# Patient Record
Sex: FEMALE | Race: WHITE | NOT HISPANIC OR LATINO | Employment: FULL TIME | URBAN - METROPOLITAN AREA
[De-identification: names, ages, dates, MRNs, and addresses within clinical notes are randomized per-mention and may not be internally consistent; named-entity substitution may affect disease eponyms.]

---

## 2017-09-19 ENCOUNTER — OFFICE VISIT (OUTPATIENT)
Dept: NEUROSURGERY | Facility: CLINIC | Age: 32
End: 2017-09-19

## 2017-09-19 VITALS — WEIGHT: 257 LBS | HEIGHT: 66 IN | TEMPERATURE: 96.4 F | BODY MASS INDEX: 41.3 KG/M2

## 2017-09-19 DIAGNOSIS — G93.89 CEREBRAL VENTRICULOMEGALY: Primary | ICD-10-CM

## 2017-09-19 PROCEDURE — 99202 OFFICE O/P NEW SF 15 MIN: CPT | Performed by: NEUROLOGICAL SURGERY

## 2017-09-19 RX ORDER — LEVOCETIRIZINE DIHYDROCHLORIDE 5 MG/1
5 TABLET, FILM COATED ORAL EVERY EVENING
COMMUNITY

## 2017-09-19 RX ORDER — IBUPROFEN 400 MG/1
400 TABLET ORAL EVERY 6 HOURS PRN
COMMUNITY

## 2017-09-19 RX ORDER — FEXOFENADINE HCL 180 MG/1
180 TABLET ORAL DAILY
COMMUNITY

## 2017-09-19 RX ORDER — LOSARTAN POTASSIUM AND HYDROCHLOROTHIAZIDE 25; 100 MG/1; MG/1
1 TABLET ORAL DAILY
COMMUNITY

## 2017-09-19 RX ORDER — SULFAMETHOXAZOLE AND TRIMETHOPRIM 800; 160 MG/1; MG/1
1 TABLET ORAL 2 TIMES DAILY
COMMUNITY

## 2017-09-19 RX ORDER — MONTELUKAST SODIUM 10 MG/1
10 TABLET ORAL NIGHTLY
COMMUNITY

## 2017-09-19 RX ORDER — BISOPROLOL FUMARATE 5 MG/1
2.5 TABLET ORAL DAILY
COMMUNITY

## 2017-09-19 RX ORDER — RANITIDINE 150 MG/1
150 TABLET ORAL 2 TIMES DAILY
COMMUNITY

## 2017-09-19 NOTE — PROGRESS NOTES
Tammi DENIS Hardin  1985  [id number]      Chief Complaint   Patient presents with   • Abnormal Brain Mri       HISTORY OF PRESENT ILLNESS:  This is a 32-year-old female who had issues with sinusitis and abscess at which time she had a CT scan which showed ventricular enlargement.  She subscap MRI which was interpreted as showing that she had moderate ventricular enlargement which was out of proportion to cortical atrophy.  She was referred for neurosurgical consultation.    She is completely asymptomatic in reference to increased intracranial pressure cognitive function and cerebellar function.  She has no symptoms at all.  This is truly a incidental finding.     Past Medical History:   Diagnosis Date   • Asthma    • Diabetes mellitus    • Hypertension        Past Surgical History:   Procedure Laterality Date   • GALLBLADDER SURGERY     • TONSILECTOMY, ADENOIDECTOMY, BILATERAL MYRINGOTOMY AND TUBES         Family History   Problem Relation Age of Onset   • Hyperlipidemia Mother    • Hypertension Mother    • Diabetes Father        Social History     Social History   • Marital status: Single     Spouse name: N/A   • Number of children: N/A   • Years of education: N/A     Occupational History   • Not on file.     Social History Main Topics   • Smoking status: Never Smoker   • Smokeless tobacco: Not on file   • Alcohol use No   • Drug use: No   • Sexual activity: Defer     Other Topics Concern   • Not on file     Social History Narrative   • No narrative on file       Allergies   Allergen Reactions   • Keflex [Cephalexin]    • Rondomycin [Methacycline]    • Wymox [Amoxicillin]          Current Outpatient Prescriptions:   •  bisoprolol (ZEBeta) 2.5 MG half tablet, Take 2.5 mg by mouth Daily., Disp: , Rfl:   •  Canagliflozin (INVOKANA PO), Take 300 mg by mouth., Disp: , Rfl:   •  fexofenadine (ALLEGRA) 180 MG tablet, Take 180 mg by mouth Daily., Disp: , Rfl:   •  ibuprofen (ADVIL,MOTRIN) 400 MG tablet, Take 400 mg by  mouth Every 6 (Six) Hours As Needed for Mild Pain ., Disp: , Rfl:   •  levocetirizine (XYZAL) 5 MG tablet, Take 5 mg by mouth Every Evening., Disp: , Rfl:   •  losartan-hydrochlorothiazide (HYZAAR) 100-25 MG per tablet, Take 1 tablet by mouth Daily., Disp: , Rfl:   •  metFORMIN (GLUCOPHAGE) 1000 MG tablet, Take 1,000 mg by mouth 2 (Two) Times a Day With Meals., Disp: , Rfl:   •  montelukast (SINGULAIR) 10 MG tablet, Take 10 mg by mouth Every Night., Disp: , Rfl:   •  Norethin Ace-Eth Estrad-FE (MICROGESTIN FE 1.5/30 PO), Take  by mouth., Disp: , Rfl:   •  raNITIdine (ZANTAC) 150 MG tablet, Take 150 mg by mouth 2 (Two) Times a Day., Disp: , Rfl:   •  sulfamethoxazole-trimethoprim (BACTRIM DS,SEPTRA DS) 800-160 MG per tablet, Take 1 tablet by mouth 2 (Two) Times a Day., Disp: , Rfl:     Review of Systems   Constitutional: Negative for activity change, appetite change, chills, diaphoresis, fatigue, fever and unexpected weight change.   HENT: Positive for facial swelling, postnasal drip, rhinorrhea and sinus pressure. Negative for congestion, dental problem, drooling, ear discharge, ear pain, hearing loss, mouth sores, nosebleeds, sneezing, sore throat, tinnitus, trouble swallowing and voice change.    Eyes: Negative for photophobia, pain, discharge, redness, itching and visual disturbance.   Respiratory: Negative for apnea, cough, choking, chest tightness, shortness of breath, wheezing and stridor.    Cardiovascular: Negative for chest pain, palpitations and leg swelling.   Gastrointestinal: Negative for abdominal distention, abdominal pain, anal bleeding, blood in stool, constipation, diarrhea, nausea, rectal pain and vomiting.   Endocrine: Negative for cold intolerance, heat intolerance, polydipsia, polyphagia and polyuria.   Genitourinary: Negative for decreased urine volume, difficulty urinating, dysuria, enuresis, flank pain, frequency, genital sores, hematuria and urgency.   Musculoskeletal: Negative for  "arthralgias, back pain, gait problem, joint swelling, myalgias, neck pain and neck stiffness.   Skin: Negative for color change, pallor, rash and wound.   Allergic/Immunologic: Positive for environmental allergies and food allergies. Negative for immunocompromised state.   Neurological: Negative for dizziness, tremors, seizures, syncope, facial asymmetry, speech difficulty, weakness, light-headedness, numbness and headaches.   Hematological: Negative for adenopathy. Does not bruise/bleed easily.   Psychiatric/Behavioral: Negative for agitation, behavioral problems, confusion, decreased concentration, dysphoric mood, hallucinations, self-injury, sleep disturbance and suicidal ideas. The patient is not nervous/anxious and is not hyperactive.    All other systems reviewed and are negative.      Vitals:    09/19/17 1549   Temp: 96.4 °F (35.8 °C)   TempSrc: Temporal Artery    Weight: 257 lb (117 kg)   Height: 66\" (167.6 cm)       Neurological Examination:  Mental status/speech: The patient is alert and oriented.  Speech is clear without aphysia or dysarthria.  No overt cognitive deficits.    Medical Decision Making:     Diagnostic Data Set:  [I reviewed the MRI of the brain.  There is no evidence of aqua ductal atresia or Arnold-Chiari malformation.  She does have enlarged ventricle over she does not have significant cortical atrophy. ]      Assessment:  Normal ventricular variant side          Recommendations:  I think the issues that she has are anatomic, congenital and nothing need be done.  I do not think that she has clinical hydrocephalus or clinical atrophy.  She is completely asymptomatic.  I suspect this is been there since birth and unless she develop symptoms I would not recommend anything be done.        I greatly appreciate the opportunity to see and evaluate this individual.  If you have questions or concerns regarding issues that I may have overlooked please call me at any time: [phone].  Twan Keenan, " M.D.  [facility]  [address]  [place] [address]    Scribed for Minh Keenan MD by Valorie Maldonado CMA. 9/19/2017  4:18 PM     I have read and concur with the information provided by the scribe.  Minh Keenan MD